# Patient Record
Sex: FEMALE | Race: WHITE | NOT HISPANIC OR LATINO
[De-identification: names, ages, dates, MRNs, and addresses within clinical notes are randomized per-mention and may not be internally consistent; named-entity substitution may affect disease eponyms.]

---

## 2017-03-08 PROBLEM — Z00.00 ENCOUNTER FOR PREVENTIVE HEALTH EXAMINATION: Status: ACTIVE | Noted: 2017-03-08

## 2017-03-09 ENCOUNTER — FORM ENCOUNTER (OUTPATIENT)
Age: 62
End: 2017-03-09

## 2017-03-10 ENCOUNTER — OUTPATIENT (OUTPATIENT)
Dept: OUTPATIENT SERVICES | Facility: HOSPITAL | Age: 62
LOS: 1 days | End: 2017-03-10
Payer: COMMERCIAL

## 2017-03-10 ENCOUNTER — APPOINTMENT (OUTPATIENT)
Dept: ORTHOPEDIC SURGERY | Facility: CLINIC | Age: 62
End: 2017-03-10

## 2017-03-10 VITALS — HEIGHT: 65 IN | WEIGHT: 170 LBS | BODY MASS INDEX: 28.32 KG/M2

## 2017-03-10 VITALS
SYSTOLIC BLOOD PRESSURE: 112 MMHG | WEIGHT: 170 LBS | DIASTOLIC BLOOD PRESSURE: 70 MMHG | HEIGHT: 65 IN | BODY MASS INDEX: 28.32 KG/M2

## 2017-03-10 VITALS — DIASTOLIC BLOOD PRESSURE: 70 MMHG | SYSTOLIC BLOOD PRESSURE: 112 MMHG

## 2017-03-10 DIAGNOSIS — Z78.9 OTHER SPECIFIED HEALTH STATUS: ICD-10-CM

## 2017-03-10 DIAGNOSIS — M19.90 UNSPECIFIED OSTEOARTHRITIS, UNSPECIFIED SITE: ICD-10-CM

## 2017-03-10 PROCEDURE — 73564 X-RAY EXAM KNEE 4 OR MORE: CPT

## 2017-03-10 PROCEDURE — 73564 X-RAY EXAM KNEE 4 OR MORE: CPT | Mod: 26,50

## 2017-03-11 ENCOUNTER — TRANSCRIPTION ENCOUNTER (OUTPATIENT)
Age: 62
End: 2017-03-11

## 2017-03-16 ENCOUNTER — APPOINTMENT (OUTPATIENT)
Dept: ORTHOPEDIC SURGERY | Facility: CLINIC | Age: 62
End: 2017-03-16

## 2017-03-16 RX ORDER — ESTRADIOL 10 UG/1
10 INSERT VAGINAL
Qty: 8 | Refills: 0 | Status: ACTIVE | COMMUNITY
Start: 2016-10-04

## 2017-03-24 ENCOUNTER — APPOINTMENT (OUTPATIENT)
Dept: ORTHOPEDIC SURGERY | Facility: CLINIC | Age: 62
End: 2017-03-24

## 2017-04-04 ENCOUNTER — APPOINTMENT (OUTPATIENT)
Dept: ORTHOPEDIC SURGERY | Facility: CLINIC | Age: 62
End: 2017-04-04

## 2017-04-04 VITALS
SYSTOLIC BLOOD PRESSURE: 114 MMHG | WEIGHT: 170 LBS | HEIGHT: 65 IN | BODY MASS INDEX: 28.32 KG/M2 | DIASTOLIC BLOOD PRESSURE: 70 MMHG

## 2017-04-11 ENCOUNTER — APPOINTMENT (OUTPATIENT)
Dept: ORTHOPEDIC SURGERY | Facility: CLINIC | Age: 62
End: 2017-04-11

## 2017-04-11 VITALS
BODY MASS INDEX: 28.32 KG/M2 | SYSTOLIC BLOOD PRESSURE: 114 MMHG | DIASTOLIC BLOOD PRESSURE: 70 MMHG | HEIGHT: 65 IN | WEIGHT: 170 LBS

## 2017-04-17 ENCOUNTER — APPOINTMENT (OUTPATIENT)
Dept: ORTHOPEDIC SURGERY | Facility: CLINIC | Age: 62
End: 2017-04-17

## 2017-05-01 ENCOUNTER — APPOINTMENT (OUTPATIENT)
Dept: ORTHOPEDIC SURGERY | Facility: CLINIC | Age: 62
End: 2017-05-01

## 2017-05-01 VITALS
BODY MASS INDEX: 28.32 KG/M2 | WEIGHT: 170 LBS | SYSTOLIC BLOOD PRESSURE: 114 MMHG | DIASTOLIC BLOOD PRESSURE: 70 MMHG | HEIGHT: 65 IN

## 2017-06-01 ENCOUNTER — APPOINTMENT (OUTPATIENT)
Dept: ORTHOPEDIC SURGERY | Facility: CLINIC | Age: 62
End: 2017-06-01

## 2017-07-05 ENCOUNTER — APPOINTMENT (OUTPATIENT)
Dept: ORTHOPEDIC SURGERY | Facility: CLINIC | Age: 62
End: 2017-07-05

## 2017-07-05 VITALS
BODY MASS INDEX: 28.32 KG/M2 | HEIGHT: 65 IN | SYSTOLIC BLOOD PRESSURE: 114 MMHG | DIASTOLIC BLOOD PRESSURE: 70 MMHG | WEIGHT: 170 LBS

## 2017-08-15 ENCOUNTER — OUTPATIENT (OUTPATIENT)
Dept: OUTPATIENT SERVICES | Facility: HOSPITAL | Age: 62
LOS: 1 days | End: 2017-08-15

## 2017-08-15 ENCOUNTER — APPOINTMENT (OUTPATIENT)
Dept: SURGERY | Facility: CLINIC | Age: 62
End: 2017-08-15

## 2017-08-15 DIAGNOSIS — G57.61 LESION OF PLANTAR NERVE, RIGHT LOWER LIMB: ICD-10-CM

## 2017-08-15 DIAGNOSIS — Z01.818 ENCOUNTER FOR OTHER PREPROCEDURAL EXAMINATION: ICD-10-CM

## 2017-08-15 DIAGNOSIS — M20.41 OTHER HAMMER TOE(S) (ACQUIRED), RIGHT FOOT: ICD-10-CM

## 2017-08-21 ENCOUNTER — APPOINTMENT (OUTPATIENT)
Dept: ORTHOPEDIC SURGERY | Facility: CLINIC | Age: 62
End: 2017-08-21
Payer: COMMERCIAL

## 2017-08-21 PROCEDURE — 99213 OFFICE O/P EST LOW 20 MIN: CPT | Mod: 25

## 2017-08-21 PROCEDURE — 20610 DRAIN/INJ JOINT/BURSA W/O US: CPT | Mod: RT

## 2017-08-22 ENCOUNTER — RESULT REVIEW (OUTPATIENT)
Age: 62
End: 2017-08-22

## 2017-08-22 ENCOUNTER — OUTPATIENT (OUTPATIENT)
Dept: OUTPATIENT SERVICES | Facility: HOSPITAL | Age: 62
LOS: 1 days | Discharge: HOME | End: 2017-08-22

## 2017-08-24 LAB — SURGICAL PATHOLOGY STUDY: SIGNIFICANT CHANGE UP

## 2017-08-28 DIAGNOSIS — G57.61 LESION OF PLANTAR NERVE, RIGHT LOWER LIMB: ICD-10-CM

## 2017-08-28 DIAGNOSIS — J44.9 CHRONIC OBSTRUCTIVE PULMONARY DISEASE, UNSPECIFIED: ICD-10-CM

## 2017-08-28 DIAGNOSIS — M20.41 OTHER HAMMER TOE(S) (ACQUIRED), RIGHT FOOT: ICD-10-CM

## 2017-08-28 DIAGNOSIS — Z87.891 PERSONAL HISTORY OF NICOTINE DEPENDENCE: ICD-10-CM

## 2017-08-28 DIAGNOSIS — M19.90 UNSPECIFIED OSTEOARTHRITIS, UNSPECIFIED SITE: ICD-10-CM

## 2017-12-03 ENCOUNTER — FORM ENCOUNTER (OUTPATIENT)
Age: 62
End: 2017-12-03

## 2017-12-04 ENCOUNTER — APPOINTMENT (OUTPATIENT)
Dept: ORTHOPEDIC SURGERY | Facility: CLINIC | Age: 62
End: 2017-12-04

## 2017-12-04 ENCOUNTER — APPOINTMENT (OUTPATIENT)
Dept: ORTHOPEDIC SURGERY | Facility: CLINIC | Age: 62
End: 2017-12-04
Payer: COMMERCIAL

## 2017-12-04 ENCOUNTER — OUTPATIENT (OUTPATIENT)
Dept: OUTPATIENT SERVICES | Facility: HOSPITAL | Age: 62
LOS: 1 days | End: 2017-12-04

## 2017-12-04 ENCOUNTER — APPOINTMENT (OUTPATIENT)
Dept: RADIOLOGY | Facility: CLINIC | Age: 62
End: 2017-12-04
Payer: COMMERCIAL

## 2017-12-04 VITALS — HEIGHT: 66 IN | BODY MASS INDEX: 27.32 KG/M2 | WEIGHT: 170 LBS

## 2017-12-04 DIAGNOSIS — M89.8X7 OTHER SPECIFIED DISORDERS OF BONE, ANKLE AND FOOT: ICD-10-CM

## 2017-12-04 DIAGNOSIS — Z82.49 FAMILY HISTORY OF ISCHEMIC HEART DISEASE AND OTHER DISEASES OF THE CIRCULATORY SYSTEM: ICD-10-CM

## 2017-12-04 DIAGNOSIS — M79.671 PAIN IN RIGHT FOOT: ICD-10-CM

## 2017-12-04 DIAGNOSIS — Z78.9 OTHER SPECIFIED HEALTH STATUS: ICD-10-CM

## 2017-12-04 DIAGNOSIS — Z80.0 FAMILY HISTORY OF MALIGNANT NEOPLASM OF DIGESTIVE ORGANS: ICD-10-CM

## 2017-12-04 PROCEDURE — 73630 X-RAY EXAM OF FOOT: CPT | Mod: 26,RT

## 2017-12-04 PROCEDURE — 99214 OFFICE O/P EST MOD 30 MIN: CPT

## 2017-12-04 RX ORDER — MUPIROCIN 20 MG/G
2 OINTMENT TOPICAL
Qty: 22 | Refills: 0 | Status: DISCONTINUED | COMMUNITY
Start: 2016-09-29 | End: 2017-12-04

## 2017-12-04 RX ORDER — AZITHROMYCIN 250 MG/1
250 TABLET, FILM COATED ORAL
Qty: 6 | Refills: 0 | Status: DISCONTINUED | COMMUNITY
Start: 2017-01-11 | End: 2017-12-04

## 2017-12-04 RX ORDER — ALBUTEROL SULFATE 90 UG/1
108 (90 BASE) AEROSOL, METERED RESPIRATORY (INHALATION)
Qty: 18 | Refills: 0 | Status: DISCONTINUED | COMMUNITY
Start: 2017-01-04 | End: 2017-12-04

## 2017-12-04 RX ORDER — CLOBETASOL PROPIONATE 0.5 MG/G
0.05 OINTMENT TOPICAL
Qty: 60 | Refills: 0 | Status: DISCONTINUED | COMMUNITY
Start: 2016-10-04 | End: 2017-12-04

## 2017-12-04 RX ORDER — CEPHALEXIN 500 MG/1
500 CAPSULE ORAL
Qty: 12 | Refills: 0 | Status: DISCONTINUED | COMMUNITY
Start: 2016-09-30 | End: 2017-12-04

## 2017-12-04 RX ORDER — HYDROCODONE BITARTRATE AND HOMATROPINE METHYLBROMIDE 5; 1.5 MG/5ML; MG/5ML
5-1.5 SOLUTION ORAL
Qty: 180 | Refills: 0 | Status: DISCONTINUED | COMMUNITY
Start: 2017-01-04 | End: 2017-12-04

## 2017-12-04 RX ORDER — ZOLPIDEM TARTRATE 5 MG/1
5 TABLET ORAL
Qty: 15 | Refills: 0 | Status: DISCONTINUED | COMMUNITY
Start: 2017-04-13 | End: 2017-12-04

## 2017-12-04 RX ORDER — AMOXICILLIN AND CLAVULANATE POTASSIUM 500; 125 MG/1; MG/1
500-125 TABLET, FILM COATED ORAL
Qty: 14 | Refills: 0 | Status: DISCONTINUED | COMMUNITY
Start: 2017-01-04 | End: 2017-12-04

## 2017-12-04 RX ORDER — LIFITEGRAST 50 MG/ML
5 SOLUTION/ DROPS OPHTHALMIC
Qty: 60 | Refills: 0 | Status: DISCONTINUED | COMMUNITY
Start: 2017-04-12 | End: 2017-12-04

## 2017-12-04 RX ORDER — VALACYCLOVIR 1 G/1
1 TABLET, FILM COATED ORAL
Qty: 20 | Refills: 0 | Status: DISCONTINUED | COMMUNITY
Start: 2016-11-30 | End: 2017-12-04

## 2017-12-04 RX ORDER — DOXYCYCLINE HYCLATE 100 MG/1
100 CAPSULE ORAL
Qty: 14 | Refills: 0 | Status: DISCONTINUED | COMMUNITY
Start: 2016-12-23 | End: 2017-12-04

## 2017-12-04 RX ORDER — HYDROCORTISONE 25 MG/G
2.5 OINTMENT TOPICAL TWICE DAILY
Qty: 20 | Refills: 0 | Status: DISCONTINUED | COMMUNITY
Start: 2016-11-17 | End: 2017-12-04

## 2017-12-04 RX ORDER — MUPIROCIN 2 G/100G
2 CREAM TOPICAL
Qty: 30 | Refills: 0 | Status: DISCONTINUED | COMMUNITY
Start: 2016-09-30 | End: 2017-12-04

## 2017-12-04 RX ORDER — TIOTROPIUM BROMIDE 18 UG/1
18 CAPSULE ORAL; RESPIRATORY (INHALATION)
Qty: 30 | Refills: 0 | Status: DISCONTINUED | COMMUNITY
Start: 2016-10-07 | End: 2017-12-04

## 2017-12-04 RX ORDER — CEFDINIR 300 MG/1
300 CAPSULE ORAL
Qty: 14 | Refills: 0 | Status: DISCONTINUED | COMMUNITY
Start: 2016-11-22 | End: 2017-12-04

## 2017-12-06 PROBLEM — M89.8X7 EXOSTOSIS OF TOE: Status: ACTIVE | Noted: 2017-12-06

## 2017-12-06 PROBLEM — M79.671 RIGHT FOOT PAIN: Noted: 2017-11-29

## 2017-12-06 PROBLEM — Z82.49 FAMILY HISTORY OF CARDIAC DISORDER: Status: ACTIVE | Noted: 2017-12-04

## 2017-12-06 PROBLEM — Z78.9 CONSUMES ALCOHOL OCCASIONALLY: Status: ACTIVE | Noted: 2017-12-04

## 2017-12-27 ENCOUNTER — APPOINTMENT (OUTPATIENT)
Dept: ORTHOPEDIC SURGERY | Facility: CLINIC | Age: 62
End: 2017-12-27

## 2018-01-10 ENCOUNTER — APPOINTMENT (OUTPATIENT)
Dept: ORTHOPEDIC SURGERY | Facility: CLINIC | Age: 63
End: 2018-01-10
Payer: COMMERCIAL

## 2018-01-10 PROCEDURE — 20610 DRAIN/INJ JOINT/BURSA W/O US: CPT | Mod: 50

## 2018-01-10 RX ADMIN — Medication 0 MG/2ML: at 00:00

## 2018-01-29 ENCOUNTER — APPOINTMENT (OUTPATIENT)
Dept: ORTHOPEDIC SURGERY | Facility: CLINIC | Age: 63
End: 2018-01-29
Payer: COMMERCIAL

## 2018-01-29 PROCEDURE — 20610 DRAIN/INJ JOINT/BURSA W/O US: CPT | Mod: 50

## 2018-02-12 ENCOUNTER — APPOINTMENT (OUTPATIENT)
Dept: ORTHOPEDIC SURGERY | Facility: CLINIC | Age: 63
End: 2018-02-12
Payer: COMMERCIAL

## 2018-02-12 PROCEDURE — 20610 DRAIN/INJ JOINT/BURSA W/O US: CPT | Mod: 50

## 2018-02-20 ENCOUNTER — APPOINTMENT (OUTPATIENT)
Dept: ORTHOPEDIC SURGERY | Facility: CLINIC | Age: 63
End: 2018-02-20
Payer: COMMERCIAL

## 2018-02-20 PROCEDURE — 20610 DRAIN/INJ JOINT/BURSA W/O US: CPT | Mod: 50

## 2018-05-29 ENCOUNTER — APPOINTMENT (OUTPATIENT)
Dept: ORTHOPEDIC SURGERY | Facility: CLINIC | Age: 63
End: 2018-05-29
Payer: COMMERCIAL

## 2018-05-29 DIAGNOSIS — M17.11 UNILATERAL PRIMARY OSTEOARTHRITIS, RIGHT KNEE: ICD-10-CM

## 2018-05-29 DIAGNOSIS — M17.12 UNILATERAL PRIMARY OSTEOARTHRITIS, LEFT KNEE: ICD-10-CM

## 2018-05-29 PROCEDURE — 99213 OFFICE O/P EST LOW 20 MIN: CPT | Mod: 25

## 2018-05-29 PROCEDURE — 20610 DRAIN/INJ JOINT/BURSA W/O US: CPT | Mod: 50

## 2018-07-10 ENCOUNTER — APPOINTMENT (OUTPATIENT)
Dept: ORTHOPEDIC SURGERY | Facility: CLINIC | Age: 63
End: 2018-07-10

## 2018-07-22 PROBLEM — Z80.0 FAMILY HISTORY OF COLON CANCER: Status: ACTIVE | Noted: 2017-12-04

## 2018-07-24 ENCOUNTER — INPATIENT (INPATIENT)
Facility: HOSPITAL | Age: 63
LOS: 0 days | Discharge: HOME | DRG: 313 | End: 2018-07-25
Attending: INTERNAL MEDICINE | Admitting: INTERNAL MEDICINE
Payer: COMMERCIAL

## 2018-07-24 VITALS
WEIGHT: 170.42 LBS | TEMPERATURE: 98 F | RESPIRATION RATE: 18 BRPM | SYSTOLIC BLOOD PRESSURE: 151 MMHG | DIASTOLIC BLOOD PRESSURE: 86 MMHG | HEART RATE: 90 BPM | OXYGEN SATURATION: 99 %

## 2018-07-24 DIAGNOSIS — R63.8 OTHER SYMPTOMS AND SIGNS CONCERNING FOOD AND FLUID INTAKE: ICD-10-CM

## 2018-07-24 DIAGNOSIS — Z29.9 ENCOUNTER FOR PROPHYLACTIC MEASURES, UNSPECIFIED: ICD-10-CM

## 2018-07-24 PROCEDURE — 71046 X-RAY EXAM CHEST 2 VIEWS: CPT | Mod: 26

## 2018-07-24 PROCEDURE — 99285 EMERGENCY DEPT VISIT HI MDM: CPT | Mod: 25

## 2018-07-24 PROCEDURE — 99222 1ST HOSP IP/OBS MODERATE 55: CPT | Mod: AI

## 2018-07-24 PROCEDURE — 93010 ELECTROCARDIOGRAM REPORT: CPT

## 2018-07-24 RX ORDER — HEPARIN SODIUM 5000 [USP'U]/ML
5000 INJECTION INTRAVENOUS; SUBCUTANEOUS EVERY 8 HOURS
Qty: 0 | Refills: 0 | Status: DISCONTINUED | OUTPATIENT
Start: 2018-07-24 | End: 2018-07-25

## 2018-07-24 RX ORDER — ASPIRIN/CALCIUM CARB/MAGNESIUM 324 MG
325 TABLET ORAL ONCE
Qty: 0 | Refills: 0 | Status: COMPLETED | OUTPATIENT
Start: 2018-07-24 | End: 2018-07-24

## 2018-07-24 RX ORDER — LIDOCAINE 4 G/100G
5 CREAM TOPICAL ONCE
Qty: 0 | Refills: 0 | Status: COMPLETED | OUTPATIENT
Start: 2018-07-24 | End: 2018-07-24

## 2018-07-24 RX ORDER — LANOLIN ALCOHOL/MO/W.PET/CERES
5 CREAM (GRAM) TOPICAL AT BEDTIME
Qty: 0 | Refills: 0 | Status: DISCONTINUED | OUTPATIENT
Start: 2018-07-24 | End: 2018-07-25

## 2018-07-24 RX ORDER — FAMOTIDINE 10 MG/ML
20 INJECTION INTRAVENOUS ONCE
Qty: 0 | Refills: 0 | Status: COMPLETED | OUTPATIENT
Start: 2018-07-24 | End: 2018-07-24

## 2018-07-24 RX ADMIN — Medication 30 MILLILITER(S): at 21:38

## 2018-07-24 RX ADMIN — LIDOCAINE 5 MILLILITER(S): 4 CREAM TOPICAL at 21:39

## 2018-07-24 RX ADMIN — FAMOTIDINE 20 MILLIGRAM(S): 10 INJECTION INTRAVENOUS at 20:53

## 2018-07-24 RX ADMIN — Medication 325 MILLIGRAM(S): at 22:02

## 2018-07-24 NOTE — H&P ADULT - NSHPPHYSICALEXAM_GEN_ALL_CORE
General: Lying in bed comfortably, in NAD  HEENT: Aniteric sclera, No pallor noted. Oral mucosa moist.  Neck: No JVD. No lymphadenopathy.  Resp: Clear to auscultation bilaterally  Cardiac: S1, S2 appreciated, No murmurs, rubs or gallops. Regular Rate and Rhythm.  GI: Soft, nontender, nondistended with + Bowel sounds  Neuro: AAOx3, following commands, moving all extremities.   Extremities: No edema noted, DP and radial pulses intact.

## 2018-07-24 NOTE — ED ADULT NURSE NOTE - OBJECTIVE STATEMENT
64 y/o female arrived to [facility] ER reporting worsening chest pain accompanied with periods of difficulty breathing and nausea since last sunday. Pt verbalized that she took nexium prior to ER arrival that had some effect. Upon assessment, abdomen soft, lung fields WNL, breathing is equal and unlabored, pulses palpable, no visible acute injuries noted. Pt denies blurred vision, slurred speech, vomiting, diarrhea, fever, chills, LOC, SOB, weakness, fatigue, recent travel, recent injury, and palpitations. EKG performed. care in progress.

## 2018-07-24 NOTE — H&P ADULT - ASSESSMENT
63F with PMH of HTN, HLD presenting for epigastric and substernal chest pain, with noted ST changes in V4-6 with negative cardiac enzymes x1. Patient admitted to [facility] for ACS r/o.

## 2018-07-24 NOTE — ED PROVIDER NOTE - MEDICAL DECISION MAKING DETAILS
? reflux discussed + GI f/u in place + cardiac enzymes, ekg, cxr and supportive care in ED + shared decision making towards disposition and outpatient follow up ? reflux discussed + GI f/u in place + cardiac enzymes, ekg, cxr and supportive care in ED + shared decision making towards disposition and outpatient follow up versus admission and in conjunction with PMD Dr SIENNA Cavanaugh plan cards admission best course of care with patient amenable and cards fellow with patient in ED

## 2018-07-24 NOTE — ED PROVIDER NOTE - ATTENDING CONTRIBUTION TO CARE
I discussed the plan of care of the patient directly with the PA while the patient was in the Emergency Department. I did not perform a face to face diagnostic evaluation on this patient. I have reviewed the ACP note and agree with the history, exam, and plan of care.

## 2018-07-24 NOTE — ED ADULT TRIAGE NOTE - CHIEF COMPLAINT QUOTE
I am having difficulty breathing.  It started gradually on Sunday and has gotten progressively worse.  I used to smoke and am pre-ephasemic.

## 2018-07-24 NOTE — H&P ADULT - NSHPLABSRESULTS_GEN_ALL_CORE
LABS:                        13.9   5.0   )-----------( 232      ( 24 Jul 2018 20:54 )             40.7     07-24    139  |  98  |  11  ----------------------------<  107<H>  4.2   |  24  |  0.66    Ca    9.2      24 Jul 2018 20:54  Mg     2.2     07-24    TPro  7.0  /  Alb  4.5  /  TBili  0.8  /  DBili  x   /  AST  22  /  ALT  34  /  AlkPhos  57  07-24    PT/INR - ( 24 Jul 2018 20:54 )   PT: 11.1 sec;   INR: 1.00          PTT - ( 24 Jul 2018 20:54 )  PTT:27.7 sec

## 2018-07-24 NOTE — ED PROVIDER NOTE - OBJECTIVE STATEMENT
atypical chest burning pain of a waxing and waning nature last few days and thus to ED for care + FH cardiac with neg w/u ~ 1-2 years ago albeit hx limited by not clear ion what type cardiac w/u Current discomfort not described as sob  or related to food or position

## 2018-07-24 NOTE — H&P ADULT - PROBLEM SELECTOR PLAN 1
P/w Chest pain and epigastric pain. No notable palliative factors. Notes to be worse with lying flat. Has been intermittent and describes as burning. Trop neg x1. EKG NSR though notable for ST depressions in therefore admitted to telemetry for ACS r/o.   -repeat troponin  -c/w Telemetry monitor  -lipid panel, A1c and TSH in AM. P/w Chest pain and epigastric pain. No notable palliative factors. Notes to be worse with lying flat. Has been intermittent and describes as burning. Trop neg x1. EKG NSR though notable for ST depressions in therefore admitted to telemetry for ACS r/o.   -repeat troponin  -c/w Telemetry monitor  -lipid panel, A1c and TSH in AM.  -Echocardiogram and CT coronaries for AM.

## 2018-07-24 NOTE — H&P ADULT - ATTENDING COMMENTS
Assessment: Patient personally seen and examined myself during rounds with the Physician Assistant/House Staff/Nurse Practitioner   Physician Assistant/House Staff/Nurse Practitioner note read, including vitals, physical findings, laboratory data, and radiological reports.   Revisions included below.   Direct personal management at bed side and extensive interpretation of the data.    Plan was outlined and discussed in details with the Physician Assistant/House Staff/Nurse Practitioner.    Decision making of high complexity   Risk high of complications, morbidity, and/or mortality  Assessment and Action taken for acute disease activity to reflect the level of care provided:  -Hemodynamic evaluation and support  -Medication reconciliation  -Review laboratory data  -EKG reviewed   -Echo reviewed   -ACS assessment and treatment as applicable  -Heart failure assessment and treatment as applicable  -Cardiac Telemetry reviewed  -Interdisciplinary discussion with IC / EP / HF / CTS teams as needed  TIME SPENT in evaluation and management, reassessments, review and interpretation of labs and x-rays, and hemodynamic management, formulating a plan and coordinating care: ___50____ MIN.  Time does not include procedural time.    Margo Villafuerte MD  Cardiology     Mobile: [phone]  Office: [phone]  [address] [place] [address]

## 2018-07-25 ENCOUNTER — APPOINTMENT (OUTPATIENT)
Dept: ORTHOPEDIC SURGERY | Facility: CLINIC | Age: 63
End: 2018-07-25

## 2018-07-25 ENCOUNTER — TRANSCRIPTION ENCOUNTER (OUTPATIENT)
Age: 63
End: 2018-07-25

## 2018-07-25 VITALS — TEMPERATURE: 97 F

## 2018-07-25 DIAGNOSIS — Z90.722 ACQUIRED ABSENCE OF OVARIES, BILATERAL: Chronic | ICD-10-CM

## 2018-07-25 LAB
ANION GAP SERPL CALC-SCNC: 12 MMOL/L — SIGNIFICANT CHANGE UP (ref 5–17)
BUN SERPL-MCNC: 11 MG/DL — SIGNIFICANT CHANGE UP (ref 7–23)
CALCIUM SERPL-MCNC: 8.8 MG/DL — SIGNIFICANT CHANGE UP (ref 8.4–10.5)
CHLORIDE SERPL-SCNC: 101 MMOL/L — SIGNIFICANT CHANGE UP (ref 96–108)
CHOLEST SERPL-MCNC: 167 MG/DL — SIGNIFICANT CHANGE UP (ref 10–199)
CK MB CFR SERPL CALC: <1 NG/ML — SIGNIFICANT CHANGE UP (ref 0–6.7)
CK SERPL-CCNC: 38 U/L — SIGNIFICANT CHANGE UP (ref 25–170)
CO2 SERPL-SCNC: 26 MMOL/L — SIGNIFICANT CHANGE UP (ref 22–31)
CREAT SERPL-MCNC: 0.67 MG/DL — SIGNIFICANT CHANGE UP (ref 0.5–1.3)
GLUCOSE BLDC GLUCOMTR-MCNC: 92 MG/DL — SIGNIFICANT CHANGE UP (ref 70–99)
GLUCOSE SERPL-MCNC: 103 MG/DL — HIGH (ref 70–99)
HBA1C BLD-MCNC: 5 % — SIGNIFICANT CHANGE UP (ref 4–5.6)
HCT VFR BLD CALC: 36.7 % — SIGNIFICANT CHANGE UP (ref 34.5–45)
HDLC SERPL-MCNC: 50 MG/DL — SIGNIFICANT CHANGE UP (ref 40–125)
HGB BLD-MCNC: 12.3 G/DL — SIGNIFICANT CHANGE UP (ref 11.5–15.5)
LIPID PNL WITH DIRECT LDL SERPL: 97 MG/DL — SIGNIFICANT CHANGE UP
MAGNESIUM SERPL-MCNC: 2.3 MG/DL — SIGNIFICANT CHANGE UP (ref 1.6–2.6)
MCHC RBC-ENTMCNC: 30.4 PG — SIGNIFICANT CHANGE UP (ref 27–34)
MCHC RBC-ENTMCNC: 33.5 G/DL — SIGNIFICANT CHANGE UP (ref 32–36)
MCV RBC AUTO: 90.6 FL — SIGNIFICANT CHANGE UP (ref 80–100)
PLATELET # BLD AUTO: 206 K/UL — SIGNIFICANT CHANGE UP (ref 150–400)
POTASSIUM SERPL-MCNC: 3.6 MMOL/L — SIGNIFICANT CHANGE UP (ref 3.5–5.3)
POTASSIUM SERPL-SCNC: 3.6 MMOL/L — SIGNIFICANT CHANGE UP (ref 3.5–5.3)
RBC # BLD: 4.05 M/UL — SIGNIFICANT CHANGE UP (ref 3.8–5.2)
RBC # FLD: 13.3 % — SIGNIFICANT CHANGE UP (ref 10.3–16.9)
SODIUM SERPL-SCNC: 139 MMOL/L — SIGNIFICANT CHANGE UP (ref 135–145)
TOTAL CHOLESTEROL/HDL RATIO MEASUREMENT: 3.3 RATIO — SIGNIFICANT CHANGE UP (ref 3.3–7.1)
TRIGL SERPL-MCNC: 101 MG/DL — SIGNIFICANT CHANGE UP (ref 10–149)
TROPONIN T SERPL-MCNC: <0.01 NG/ML — SIGNIFICANT CHANGE UP (ref 0–0.01)
TSH SERPL-MCNC: 2.89 UIU/ML — SIGNIFICANT CHANGE UP (ref 0.35–4.94)
WBC # BLD: 3 K/UL — LOW (ref 3.8–10.5)
WBC # FLD AUTO: 3 K/UL — LOW (ref 3.8–10.5)

## 2018-07-25 PROCEDURE — 93306 TTE W/DOPPLER COMPLETE: CPT | Mod: 26

## 2018-07-25 PROCEDURE — 93010 ELECTROCARDIOGRAM REPORT: CPT

## 2018-07-25 PROCEDURE — 99238 HOSP IP/OBS DSCHRG MGMT 30/<: CPT

## 2018-07-25 RX ORDER — POTASSIUM CHLORIDE 20 MEQ
40 PACKET (EA) ORAL ONCE
Qty: 0 | Refills: 0 | Status: COMPLETED | OUTPATIENT
Start: 2018-07-25 | End: 2018-07-25

## 2018-07-25 RX ADMIN — Medication 5 MILLIGRAM(S): at 00:26

## 2018-07-25 RX ADMIN — Medication 40 MILLIEQUIVALENT(S): at 06:29

## 2018-07-25 RX ADMIN — HEPARIN SODIUM 5000 UNIT(S): 5000 INJECTION INTRAVENOUS; SUBCUTANEOUS at 06:13

## 2018-07-25 NOTE — DISCHARGE NOTE ADULT - CARE PLAN
Principal Discharge DX:	Atypical chest pain  Goal:	Follow up with your PMD and cardiologist in 1-2 weeks.  Assessment and plan of treatment:	You came into the hospital for chest pain and had bloodwork and EKGs performed that DID NOT show you had a heart attack. Your EKG was compared to your outpatient EKGs, and it was similar when compared. You had echocardiogram that showed you have a normal heart pumping function (EF 60-65%), it also showed some mildly leaky heart valves: mild tricuspid regurgitation and trace mitral regurgitation. You had a CT Scan of the Heart that showed

## 2018-07-25 NOTE — DISCHARGE NOTE ADULT - HOSPITAL COURSE
63F with PMH HTN, HLD presenting for epigastric and chest pain for 3 days duration. Patient states noted some burning pain in epigastric region of abdomen as well as sternum on Sunday (7/22). Patient states that she assumed it was reflux and did not do anything about it. Patient states progressively worsened, with associated night time wakening (while lying flat). Reports no changes in food. Unsure of any type of food that makes it worse. Denies trying any medication to relieve pain. Patient reports no significant weight changes recently. Patient states pain is 6-7/10 that is intermittent. Denies radiation to jaw, back or arm. Denies any previous episodes of similar pain. Pt states that she presented now given family hx of cardiac disease (father with MI at age 40, passed at 59yo). Patient states that she has also recently felt that shes had chills and body aches. Patient recently treated with 2 week course of doxycycline after concern for insect bite while in [place] in June- noted to have round red rash with questionable central clearing. No witnessed ticks.   Patient denies diaphoresis though reports some nausea. No vomiting. Reports recent frontal headaches and body aches. Denies significant weight loss. Denies night sweats. Denies shortness of breath or dyspnea on exertion. Denies orthopnea. + epigastric pain with some associated nausea, no vomiting.   In ED, VS: /86, HR 90, RR 18, 98.2F, 98% O2 sat on RA. EKG notable for ST depressions in V4-6. Trop negative x1. Given Maalox and asa in ED and states significantly improved pain. 63F with PMH HTN, HLD presenting for epigastric and chest pain for 3 days duration. Patient states noted some 6/10 burning pain in epigastric region of abdomen as well as sternum on Sunday (7/22). Reports pain is intermittent w/ radiation across R chest a/w some nausea. Patient states that she assumed it was reflux and did not do anything about it, but symptoms progressively worsened, with associated night time wakening (while lying flat), and presented now given family hx of cardiac disease (father with MI at age 40, passed at 61yo). Patient recently treated with 2 week course of doxycycline after concern for insect bite while in [place] in June- noted to have round red rash with questionable central clearing. No witnessed ticks. In ED, VS: /86, HR 90, RR 18, 98.2F, 98% O2 sat on RA. EKG notable for ST depressions in V4-6. Trop negative x1. Given Maalox and asa in ED and states chest pain significantly improved. She was ruled out for ACS. Old EKGs obtained from outpt PMD office noting V4-V6 TWi/<5mm ST depressions.  Trop negative x2. CTA cardiac performed w/ calcium score of zero. Echo performed w/ EF  60-65%, trace MR, mild TR. Pt remains hemodynamically stable and will be d/c home and f/u with outpt PMD and cardiologist. 63F with PMH HTN, HLD presenting for epigastric and chest pain for 3 days duration. Patient states noted some 6/10 burning pain in epigastric region of abdomen as well as sternum on Sunday (7/22). Reports pain is intermittent w/ radiation across R chest a/w some nausea. Patient states that she assumed it was reflux and did not do anything about it, but symptoms progressively worsened, with associated night time wakening (while lying flat), and presented now given family hx of cardiac disease (father with MI at age 40, passed at 61yo). Patient recently treated with 2 week course of doxycycline after concern for insect bite while in [place] in June- noted to have round red rash with questionable central clearing. No witnessed ticks. In ED, VS: /86, HR 90, RR 18, 98.2F, 98% O2 sat on RA. EKG notable for ST depressions in V4-6. Trop negative x1. Given Maalox and asa in ED and states chest pain significantly improved. She was ruled out for ACS. Old EKGs obtained from outpt PMD office noting V4-V6 TWi/<5mm ST depressions.  Trop negative x2. CTA cardiac performed w/ calcium score of zero, prelim report negative for ischemia. Echo performed w/ EF  60-65%, trace MR, mild TR. Pt remains hemodynamically stable and will be d/c home and f/u with outpt PMD and cardiologist. Pt to be discharged prior to full CTA cardiac report resulted per Dr Villafuerte and Dr ADAN Rodríguez, CDs w/ CTA cardiac provided to pt to take to outpt cardiologist.

## 2018-07-25 NOTE — DISCHARGE NOTE ADULT - ADDITIONAL INSTRUCTIONS
1. Please follow up with your cardiologist Dr Henriquez in 2 weeks  Cristofer Henriquez (MD), Cardiovascular Medicine  [address] [place] [address]  Phone: [phone]    2. Please follow up with your PMD Dr Samina Cavanaugh in 1-2 weeks.  [phone]

## 2018-07-25 NOTE — DISCHARGE NOTE ADULT - PATIENT PORTAL LINK FT
You can access the [facility], offered by [facility], by registering with the following website: [email or web address]

## 2018-07-25 NOTE — CONSULT NOTE ADULT - ATTENDING COMMENTS
CP--r/o ACS  HTN  GI--GERD  tele/labs CP--r/o ACS  HTN  GI--GERD  tele/labs  neg trop  CV stable  d/c home out pt f/u

## 2018-07-25 NOTE — DISCHARGE NOTE ADULT - PLAN OF CARE
Follow up with your PMD and cardiologist in 1-2 weeks. You came into the hospital for chest pain and had bloodwork and EKGs performed that DID NOT show you had a heart attack. Your EKG was compared to your outpatient EKGs, and it was similar when compared. You had echocardiogram that showed you have a normal heart pumping function (EF 60-65%), it also showed some mildly leaky heart valves: mild tricuspid regurgitation and trace mitral regurgitation. You had a CT Scan of the Heart that showed

## 2018-07-25 NOTE — DISCHARGE NOTE ADULT - CARE PROVIDER_API CALL
Cristofer Henriquez (MD), Cardiovascular Medicine  [address] [place] [address]  Phone: [phone]  Fax: [phone]

## 2018-07-25 NOTE — CONSULT NOTE ADULT - SUBJECTIVE AND OBJECTIVE BOX
TIERA VELÁSQUEZ      Patient is a 63y old  Female who presents with a chief complaint of ACS r/o (24 Jul 2018 23:27)      HPI:  63F with PMH HTN, HLD presenting for epigastric and chest pain for 3 days duration. Patient states noted some burning pain in epigastric region of abdomen as well as sternum on Sunday (7/22). Patient states that she assumed it was reflux and did not do anything about it. Patient states progressively worsened, with associated night time wakening (while lying flat). Reports no changes in food. Unsure of any type of food that makes it worse. Denies trying any medication to relieve pain. Patient reports no significant weight changes recently. Patient states pain is 6-7/10 that is intermittent. Denies radiation to jaw, back or arm. Denies any previous episodes of similar pain. Pt states that she presented now given family hx of cardiac disease (father with MI at age 40, passed at 61yo). Patient states that she has also recently felt that shes had chills and body aches. Patient recently treated with 2 week course of doxycycline after concern for insect bite while in [place] in June- noted to have round red rash with questionable central clearing. No witnessed ticks.   Patient denies diaphoresis though reports some nausea. No vomiting. Reports recent frontal headaches and body aches. Denies significant weight loss. Denies night sweats. Denies shortness of breath or dyspnea on exertion. Denies orthopnea. + epigastric pain with some associated nausea, no vomiting.   In ED, VS: /86, HR 90, RR 18, 98.2F, 98% O2 sat on RA. EKG notable for ST depressions in V4-6. Trop negative x1. Given Maalox and asa in ED and states significantly improved pain. (24 Jul 2018 23:27)      Addl  Medical issues:       HEALTH ISSUES - PROBLEM Dx:  Nutrition, metabolism, and development symptoms: Nutrition, metabolism, and development symptoms  Need for prophylactic measure: Need for prophylactic measure            MEDICATIONS  (STANDING):  heparin  Injectable 5000 Unit(s) SubCutaneous every 8 hours  melatonin 5 milliGRAM(s) Oral at bedtime    MEDICATIONS  (PRN):          PAST MEDICAL & SURGICAL HISTORY:  Essential hypertension  H/O bilateral oophorectomy: Early 2000s      REVIEW OF SYSTEMS:  [x] As per HPI          Reviewed   no change                            Changes noted  CONSTITUTIONAL: No fever, weight loss, or fatigue  RESPIRATORY: No cough, wheezing, chills or hemoptysis; No Shortness of Breath  CARDIOVASCULAR: No chest pain, palpitations, dizziness, or leg swelling  GASTROINTESTINAL: No abdominal or epigastric pain. No nausea, vomiting, or hematemesis; No diarrhea or constipation. No melena or hematochezia.  MUSCULOSKELETAL: No joint pain or swelling; No muscle, back, or extremity pain  Neuro:   Grossly  Negative  Psych        Awake  alert  [x] All others negative	  [ ] Unable to obtain      Vital Signs Last 24 Hrs  T(C): 36.1 (25 Jul 2018 14:06), Max: 37.8 (24 Jul 2018 23:15)  T(F): 97 (25 Jul 2018 14:06), Max: 100 (24 Jul 2018 23:15)  HR: 60 (25 Jul 2018 11:45) (60 - 90)  BP: 92/56 (25 Jul 2018 11:45) (92/56 - 151/86)  BP(mean): 65 (25 Jul 2018 11:45) (65 - 77)  RR: 16 (25 Jul 2018 11:45) (16 - 18)  SpO2: 95% (25 Jul 2018 11:45) (94% - 99%)    PHYSICAL EXAM:      Constitutional:    Eyes:    ENMT:    Neck:    Breasts:    Back:    Respiratory:    Cardiovascular:    Gastrointestinal:    Genitourinary:    Rectal:    Extremities:    Vascular:    Neurological:    Skin:    Lymph Nodes:    Musculoskeletal:    Psychiatric:                              12.3   3.0   )-----------( 206      ( 25 Jul 2018 05:42 )             36.7     07-25    139  |  101  |  11  ----------------------------<  103<H>  3.6   |  26  |  0.67    Ca    8.8      25 Jul 2018 05:44  Mg     2.3     07-25    TPro  7.0  /  Alb  4.5  /  TBili  0.8  /  DBili  x   /  AST  22  /  ALT  34  /  AlkPhos  57  07-24    CARDIAC MARKERS ( 24 Jul 2018 20:54 )  x     / <0.01 ng/mL / 47 U/L / x     / <1.0 ng/mL      PT/INR - ( 24 Jul 2018 20:54 )   PT: 11.1 sec;   INR: 1.00          PTT - ( 24 Jul 2018 20:54 )  PTT:27.7 sec  CAPILLARY BLOOD GLUCOSE      POCT Blood Glucose.: 92 mg/dL (25 Jul 2018 11:24)      I&O's Summary          ASSESSMENT/PLAN/RECOMMENDATIONS TIERA EVLÁSQUEZ      Patient is a 63y old  Female who presents with a chief complaint of ACS r/o (24 Jul 2018 23:27)      HPI:  63F with PMH HTN, HLD presenting for epigastric and chest pain for 3 days duration. Patient states noted some burning pain in epigastric region of abdomen as well as sternum on Sunday (7/22). Patient states that she assumed it was reflux and did not do anything about it. Patient states progressively worsened, with associated night time wakening (while lying flat). Reports no changes in food. Unsure of any type of food that makes it worse. Denies trying any medication to relieve pain. Patient reports no significant weight changes recently. Patient states pain is 6-7/10 that is intermittent. Denies radiation to jaw, back or arm. Denies any previous episodes of similar pain. Pt states that she presented now given family hx of cardiac disease (father with MI at age 40, passed at 61yo). Patient states that she has also recently felt that shes had chills and body aches. Patient recently treated with 2 week course of doxycycline after concern for insect bite while in [place] in June- noted to have round red rash with questionable central clearing. No witnessed ticks.   Patient denies diaphoresis though reports some nausea. No vomiting. Reports recent frontal headaches and body aches. Denies significant weight loss. Denies night sweats. Denies shortness of breath or dyspnea on exertion. Denies orthopnea. + epigastric pain with some associated nausea, no vomiting.   In ED, VS: /86, HR 90, RR 18, 98.2F, 98% O2 sat on RA. EKG notable for ST depressions in V4-6. Trop negative x1. Given Maalox and asa in ED and states significantly improved pain. (24 Jul 2018 23:27)      Addl  Medical issues:       HEALTH ISSUES - PROBLEM Dx:  Nutrition, metabolism, and development symptoms: Nutrition, metabolism, and development symptoms  Need for prophylactic measure: Need for prophylactic measure            MEDICATIONS  (STANDING):  heparin  Injectable 5000 Unit(s) SubCutaneous every 8 hours  melatonin 5 milliGRAM(s) Oral at bedtime    MEDICATIONS  (PRN):          PAST MEDICAL & SURGICAL HISTORY:  Essential hypertension  H/O bilateral oophorectomy: Early 2000s      REVIEW OF SYSTEMS:  [x] As per HPI          Reviewed   no change                            Changes noted  CONSTITUTIONAL: No fever, weight loss, or fatigue  RESPIRATORY: No cough, wheezing, chills or hemoptysis; No Shortness of Breath  CARDIOVASCULAR:   GASTROINTESTINAL: No abdominal or epigastric pain. No nausea, vomiting, or hematemesis; No diarrhea or constipation. No melena or hematochezia.  MUSCULOSKELETAL: No joint pain or swelling; No muscle, back, or extremity pain  Neuro:   Grossly  Negative  Psych        Awake  alert  [x] All others negative	  [ ] Unable to obtain      Vital Signs Last 24 Hrs  T(C): 36.1 (25 Jul 2018 14:06), Max: 37.8 (24 Jul 2018 23:15)  T(F): 97 (25 Jul 2018 14:06), Max: 100 (24 Jul 2018 23:15)  HR: 60 (25 Jul 2018 11:45) (60 - 90)  BP: 92/56 (25 Jul 2018 11:45) (92/56 - 151/86)  BP(mean): 65 (25 Jul 2018 11:45) (65 - 77)  RR: 16 (25 Jul 2018 11:45) (16 - 18)  SpO2: 95% (25 Jul 2018 11:45) (94% - 99%)    PHYSICAL EXAM:      Constitutional:nad    Eyes:    ENMT:    Neck:neg    Breasts:    Back:    Respiratory:clear    Cardiovascular:s1s2    Gastrointestinal:soft BS present    Genitourinary:    Rectal:    Extremities:FROM    Vascular:    Neurological:neg    Skin:    Lymph Nodes:    Musculoskeletal:wnl    Psychiatric:awake/alert                              12.3   3.0   )-----------( 206      ( 25 Jul 2018 05:42 )             36.7     07-25    139  |  101  |  11  ----------------------------<  103<H>  3.6   |  26  |  0.67    Ca    8.8      25 Jul 2018 05:44  Mg     2.3     07-25    TPro  7.0  /  Alb  4.5  /  TBili  0.8  /  DBili  x   /  AST  22  /  ALT  34  /  AlkPhos  57  07-24    CARDIAC MARKERS ( 24 Jul 2018 20:54 )  x     / <0.01 ng/mL / 47 U/L / x     / <1.0 ng/mL      PT/INR - ( 24 Jul 2018 20:54 )   PT: 11.1 sec;   INR: 1.00          PTT - ( 24 Jul 2018 20:54 )  PTT:27.7 sec  CAPILLARY BLOOD GLUCOSE      POCT Blood Glucose.: 92 mg/dL (25 Jul 2018 11:24)      I&O's Summary          ASSESSMENT/PLAN/RECOMMENDATIONS

## 2018-07-26 PROCEDURE — 75574 CT ANGIO HRT W/3D IMAGE: CPT | Mod: 26

## 2018-07-26 PROCEDURE — 83880 ASSAY OF NATRIURETIC PEPTIDE: CPT

## 2018-07-26 PROCEDURE — 82550 ASSAY OF CK (CPK): CPT

## 2018-07-26 PROCEDURE — 71046 X-RAY EXAM CHEST 2 VIEWS: CPT

## 2018-07-26 PROCEDURE — 84443 ASSAY THYROID STIM HORMONE: CPT

## 2018-07-26 PROCEDURE — 80048 BASIC METABOLIC PNL TOTAL CA: CPT

## 2018-07-26 PROCEDURE — 85610 PROTHROMBIN TIME: CPT

## 2018-07-26 PROCEDURE — 36415 COLL VENOUS BLD VENIPUNCTURE: CPT

## 2018-07-26 PROCEDURE — 99285 EMERGENCY DEPT VISIT HI MDM: CPT | Mod: 25

## 2018-07-26 PROCEDURE — 83735 ASSAY OF MAGNESIUM: CPT

## 2018-07-26 PROCEDURE — 82962 GLUCOSE BLOOD TEST: CPT

## 2018-07-26 PROCEDURE — 85027 COMPLETE CBC AUTOMATED: CPT

## 2018-07-26 PROCEDURE — 84484 ASSAY OF TROPONIN QUANT: CPT

## 2018-07-26 PROCEDURE — 75574 CT ANGIO HRT W/3D IMAGE: CPT

## 2018-07-26 PROCEDURE — 93005 ELECTROCARDIOGRAM TRACING: CPT

## 2018-07-26 PROCEDURE — 96374 THER/PROPH/DIAG INJ IV PUSH: CPT

## 2018-07-26 PROCEDURE — 85730 THROMBOPLASTIN TIME PARTIAL: CPT

## 2018-07-26 PROCEDURE — 80053 COMPREHEN METABOLIC PANEL: CPT

## 2018-07-26 PROCEDURE — 83690 ASSAY OF LIPASE: CPT

## 2018-07-26 PROCEDURE — 85025 COMPLETE CBC W/AUTO DIFF WBC: CPT

## 2018-07-26 PROCEDURE — 83036 HEMOGLOBIN GLYCOSYLATED A1C: CPT

## 2018-07-26 PROCEDURE — 93306 TTE W/DOPPLER COMPLETE: CPT

## 2018-07-26 PROCEDURE — G0378: CPT

## 2018-07-26 PROCEDURE — 82553 CREATINE MB FRACTION: CPT

## 2018-07-26 PROCEDURE — 80061 LIPID PANEL: CPT

## 2018-07-30 DIAGNOSIS — R07.89 OTHER CHEST PAIN: ICD-10-CM

## 2018-07-30 DIAGNOSIS — Z87.891 PERSONAL HISTORY OF NICOTINE DEPENDENCE: ICD-10-CM

## 2018-07-30 DIAGNOSIS — K21.9 GASTRO-ESOPHAGEAL REFLUX DISEASE WITHOUT ESOPHAGITIS: ICD-10-CM

## 2018-07-30 DIAGNOSIS — R10.13 EPIGASTRIC PAIN: ICD-10-CM

## 2018-07-30 DIAGNOSIS — Z82.49 FAMILY HISTORY OF ISCHEMIC HEART DISEASE AND OTHER DISEASES OF THE CIRCULATORY SYSTEM: ICD-10-CM

## 2018-07-30 DIAGNOSIS — E78.5 HYPERLIPIDEMIA, UNSPECIFIED: ICD-10-CM

## 2018-07-30 DIAGNOSIS — I10 ESSENTIAL (PRIMARY) HYPERTENSION: ICD-10-CM

## 2018-08-16 PROBLEM — I10 ESSENTIAL (PRIMARY) HYPERTENSION: Chronic | Status: ACTIVE | Noted: 2018-07-25

## 2018-08-22 ENCOUNTER — OUTPATIENT (OUTPATIENT)
Dept: OUTPATIENT SERVICES | Facility: HOSPITAL | Age: 63
LOS: 1 days | End: 2018-08-22
Payer: COMMERCIAL

## 2018-08-22 DIAGNOSIS — Z90.722 ACQUIRED ABSENCE OF OVARIES, BILATERAL: Chronic | ICD-10-CM

## 2018-08-22 PROCEDURE — A9537: CPT

## 2018-08-22 PROCEDURE — 78227 HEPATOBIL SYST IMAGE W/DRUG: CPT

## 2018-08-22 PROCEDURE — 78227 HEPATOBIL SYST IMAGE W/DRUG: CPT | Mod: 26

## 2018-09-05 ENCOUNTER — APPOINTMENT (OUTPATIENT)
Dept: HEART AND VASCULAR | Facility: CLINIC | Age: 63
End: 2018-09-05
Payer: COMMERCIAL

## 2018-09-05 DIAGNOSIS — R07.9 CHEST PAIN, UNSPECIFIED: ICD-10-CM

## 2018-09-05 PROCEDURE — 99214 OFFICE O/P EST MOD 30 MIN: CPT

## 2019-06-17 ENCOUNTER — APPOINTMENT (OUTPATIENT)
Dept: VASCULAR SURGERY | Facility: CLINIC | Age: 64
End: 2019-06-17
Payer: MEDICARE

## 2019-06-17 PROCEDURE — 99203 OFFICE O/P NEW LOW 30 MIN: CPT

## 2019-06-17 PROCEDURE — 93970 EXTREMITY STUDY: CPT

## 2019-06-20 NOTE — PHYSICAL EXAM
[Respiratory Effort] : normal respiratory effort [Alert] : alert [Calm] : calm [2+] : left 2+ [Varicose Veins Of Lower Extremities] : bilaterally [Ankle Swelling On The Left] : moderate [JVD] : no jugular venous distention  [de-identified] : Well appearing. NAD [de-identified] : NCAT [de-identified] : FROM

## 2019-06-20 NOTE — END OF VISIT
[FreeTextEntry3] : All medical record entries made by the Scribe were at my, Dr. Klein's, discretion and personally dictated by me on 06/17/2019 . I have reviewed the chart and agree that the record accurately reflects my personal performance of the history, physical exam, assessment and plan. I have also personally directed, reviewed and agreed to the chart.

## 2019-06-20 NOTE — ADDENDUM
[FreeTextEntry1] : This note was written by Chloe Irwin on 06/17/2019  acting as scribe for Dr. Ramirez.

## 2019-06-20 NOTE — HISTORY OF PRESENT ILLNESS
[FreeTextEntry1] : 64 year old female former smoker with HTN presents to the office today for initial evaluation of b/l varicose veins. She reports that the veins are painful, burning and feel heavy. She reports that they also bulge, worse at night and after activity. Patient denies any claudication, ulcerations, rest pain, or leg swelling. She is s/p right total knee replacement in October of 2018 and reports that the varicose veins around her right knee bulge more and are especially painful. She reports that she may need an arthroscopic procedure on her right knee.\par She reports that her father had CAD.\par

## 2019-06-20 NOTE — ASSESSMENT
[FreeTextEntry1] : 65 y/o F with symptomatic varicose veins. B/l US demonstrates no DVT or reflux, but the presence of some diffuse varicose veins. I recommend a stab phlebectomy procedure, right leg before left leg. The risks, benefits, and alternative to the proposed procedure were discussed with the patient and all questions were answered to her satisfaction. Will schedule this procedure.\par \par I informed her that I can only remove about 90% of her varicose veins but the spider veins will remain. The spider veins will not be painful but if the appearance bothers her she can have a sclerotherapy procedure to remove them. I informed her that spider vein removal is cosmetic and therefore not covered by insurance. Sessions are about $500 each and I informed her that she will need 3-4 sessions per leg.

## 2019-06-24 NOTE — ED PROVIDER NOTE - NS ED ATTENDING STATEMENT MOD
Learning About Relief for Back Pain  What is back tension and strain? Back strain happens when you overstretch, or pull, a muscle in your back. You may hurt your back in an accident or when you exercise or lift something. Most back pain will get better with rest and time. You can take care of yourself at home to help your back heal.  What can you do first to relieve back pain? When you first feel back pain, try these steps:  · Walk. Take a short walk (10 to 20 minutes) on a level surface (no slopes, hills, or stairs) every 2 to 3 hours. Walk only distances you can manage without pain, especially leg pain. · Relax. Find a comfortable position for rest. Some people are comfortable on the floor or a medium-firm bed with a small pillow under their head and another under their knees. Some people prefer to lie on their side with a pillow between their knees. Don't stay in one position for too long. · Try heat or ice. Try using a heating pad on a low or medium setting, or take a warm shower, for 15 to 20 minutes every 2 to 3 hours. Or you can buy single-use heat wraps that last up to 8 hours. You can also try an ice pack for 10 to 15 minutes every 2 to 3 hours. You can use an ice pack or a bag of frozen vegetables wrapped in a thin towel. There is not strong evidence that either heat or ice will help, but you can try them to see if they help. You may also want to try switching between heat and cold. · Take pain medicine exactly as directed. ? If the doctor gave you a prescription medicine for pain, take it as prescribed. ? If you are not taking a prescription pain medicine, ask your doctor if you can take an over-the-counter medicine. What else can you do? · Stretch and exercise. Exercises that increase flexibility may relieve your pain and make it easier for your muscles to keep your spine in a good, neutral position. And don't forget to keep walking. · Do self-massage.  You can use self-massage to unwind after work or school or to energize yourself in the morning. You can easily massage your feet, hands, or neck. Self-massage works best if you are in comfortable clothes and are sitting or lying in a comfortable position. Use oil or lotion to massage bare skin. · Reduce stress. Back pain can lead to a vicious Andreafski: Distress about the pain tenses the muscles in your back, which in turn causes more pain. Learn how to relax your mind and your muscles to lower your stress. Where can you learn more? Go to http://yahir-gary.info/. Enter E578 in the search box to learn more about \"Learning About Relief for Back Pain. \"  Current as of: September 20, 2018  Content Version: 11.9  © 3318-4117 [employer]. Care instructions adapted under license by [employer] (which disclaims liability or warranty for this information). If you have questions about a medical condition or this instruction, always ask your healthcare professional. [place] [address] any warranty or liability for your use of this information. I have personally performed a face to face diagnostic evaluation on this patient. I have reviewed the ACP note and agree with the history, exam and plan of care, except as noted.

## 2021-04-14 NOTE — DISCHARGE NOTE ADULT - MEDICATION SUMMARY - MEDICATIONS TO TAKE
I will START or STAY ON the medications listed below when I get home from the hospital:  None
choking

## 2021-05-18 ENCOUNTER — NON-APPOINTMENT (OUTPATIENT)
Age: 66
End: 2021-05-18

## 2021-05-19 ENCOUNTER — NON-APPOINTMENT (OUTPATIENT)
Age: 66
End: 2021-05-19

## 2021-05-20 ENCOUNTER — APPOINTMENT (OUTPATIENT)
Dept: COLORECTAL SURGERY | Facility: CLINIC | Age: 66
End: 2021-05-20
Payer: MEDICARE

## 2021-05-20 VITALS
DIASTOLIC BLOOD PRESSURE: 71 MMHG | HEIGHT: 66 IN | SYSTOLIC BLOOD PRESSURE: 106 MMHG | HEART RATE: 84 BPM | TEMPERATURE: 97.9 F | WEIGHT: 184 LBS | BODY MASS INDEX: 29.57 KG/M2

## 2021-05-20 DIAGNOSIS — K64.8 OTHER HEMORRHOIDS: ICD-10-CM

## 2021-05-20 PROCEDURE — 46600 DIAGNOSTIC ANOSCOPY SPX: CPT

## 2021-05-20 PROCEDURE — 99203 OFFICE O/P NEW LOW 30 MIN: CPT | Mod: 25

## 2021-05-20 RX ORDER — HYDROCORTISONE 25 MG/G
2.5 CREAM TOPICAL 3 TIMES DAILY
Qty: 1 | Refills: 3 | Status: ACTIVE | COMMUNITY
Start: 2021-05-20 | End: 1900-01-01

## 2021-05-20 NOTE — HISTORY OF PRESENT ILLNESS
[FreeTextEntry1] : 67 y/o F presents for evaluation of possible hemorrhoids and anal fissure\par \par C/o burning sensation with most BM's for the past 2 months\par Reports intermittent issues with external swelling after BM\par Denies itching or bleeding\par  x 2, episiotomy and large hemorrhoids due to prolonged pushing (5 hours) with first pregnancy  - treated with rubber band ligation at that time about 30 years ago\par BH:  once per week or longer\par H/o chronic constipation. Admits to straining\par Has never seen a GI for constipation \par No use of stool softeners, fiber supplements or laxatives\par Admits to limited dietary fiber intake. Currently drinking one larger (24-32 oz.) bottles of water daily \par FMH of CRC in mother, diagnosed in her 50's\par No ASA/NSAIDs last 7 days  \par \par Last colonoscopy completed with Dr Reed at [facility] 2-3 years ago , several benign polyps removed

## 2021-05-20 NOTE — PHYSICAL EXAM
[FreeTextEntry1] : Medical assistant present for duration of physical examination\par \par General no acute distress, alert and oriented\par Psych calm, pleasant demeanor, responding appropriately to questions\par Nonlabored breathing\par Ambulating without assistance\par Skin normal color and pigment, no visible lesions or rashes\par \par Anorectal Exam:\par Inspection no erythema, induration or fluctuance, no skin excoriation, no fissure, no inflammation of external hemorrhoidal cushions, left lateral internal hemorrhoidal prolapse visible at anal verge\par GILBERT nontender, no masses palpated, no blood on gloved finger\par \par Procedure: Anoscopy\par \par Pre procedure Diagnosis: hemorrhoids\par Post procedure Diagnosis: hemorrhoids\par Anesthesia: none\par Estimated blood loss: none\par Specimen: none\par Complications: none\par \par Consent obtained. Anoscopy was performed by passing a lighted anoscope with lubricant jelly into the anal canal and the entire anal mucosal surface was inspected. Findings included no fissure, moderate internal hemorrhoids (LL, RA, RP), no visible masses or lesions\par \par Patient tolerated examination and procedure well.\par \par \par

## 2021-05-20 NOTE — ASSESSMENT
[FreeTextEntry1] : Exam findings and diagnosis were discussed at length with patient. \par Recommendations including increased fiber intake, adequate daily hydration, stool softeners as needed, and sitz baths as needed and after bowel movements were discussed.\par Medical management, such as hydrocortisone cream, was discussed.\par Office based treatment, such as rubber band ligation, was discussed as an alternative if symptoms persist despite conservative management.\par Role of hemorrhoidectomy also discussed.\par Office procedure and surgical risks and benefits discussed.\par \par Recommend patient consider rubber band ligation. Patient agreeable and understands multiple treatments may be needed to treat multiple hemorrhoids, but she would prefer to initiate treatments at a future visit.\par Hydrocortisone prn in interim\par F/u visit to start hemorrhoid treatments\par \par All questions answered, patient expressed understanding and is agreeable to this plan.\par

## 2021-06-09 NOTE — HISTORY OF PRESENT ILLNESS
[FreeTextEntry1] : 65 y/o F presents for f/u hemorrhoid\par Last seen in the office on 5/20/21. Moderate internal hemorrhoids noted, patient deferred RBL until future appointment. Patient started on hydrocortisone TID\par \par \par BH:\par H/o chronic constipation. Admits to straining\par \par Last colonoscopy completed with Dr Reed at [facility] 2-3 years ago , several benign polyps removed \par  ASA/NSAIDs last 7 days

## 2021-06-10 ENCOUNTER — APPOINTMENT (OUTPATIENT)
Dept: COLORECTAL SURGERY | Facility: CLINIC | Age: 66
End: 2021-06-10

## 2021-10-14 NOTE — ED PROVIDER NOTE - NS ED MD DISPO DIVISION
What Type Of Note Output Would You Prefer (Optional)?: Standard Output How Severe Is Your Rash?: moderate Is This A New Presentation, Or A Follow-Up?: Follow Up Rash (3) assistive equipment and person [facility]

## 2022-09-21 NOTE — H&P ADULT - HISTORY OF PRESENT ILLNESS
63F with PMH HTN, HLD presenting for epigastric and chest pain for 3 days duration. Patient states noted some burning pain in epigastric region of abdomen as well as sternum on Sunday (7/22). Patient states that she assumed it was reflux and did not do anything about it. Patient states progressively worsened, with associated night time wakening (while lying flat). Reports no changes in food. Unsure of any type of food that makes it worse. Denies trying any medication to relieve pain. Patient reports no significant weight changes recently. Patient states pain is 6-7/10 that is intermittent. Denies radiation to jaw, back or arm. Denies any previous episodes of similar pain. Pt states that she presented now given family hx of cardiac disease (father with MI at age 40, passed at 59yo). Patient states that she has also recently felt that shes had chills and body aches. Patient recently treated with 2 week course of doxycycline after concern for insect bite while in [place] in June- noted to have round red rash with questionable central clearing. No witnessed ticks.   Patient denies diaphoresis though reports some nausea. Reports recent frontal headaches and body aches. Denies significant weight loss. Denies night sweats. Denies shortness of breath or dyspnea on exertion. Denies orthopnea. + epigastric pain with some associated nausea, no vomiting.   In ED, VS: /86, HR 90, RR 18, 98.2F, 98% O2 sat on RA. EKG notable for ST depressions in V4-6. Troponins negative x1. 63F with PMH HTN, HLD presenting for epigastric and chest pain for 3 days duration. Patient states noted some burning pain in epigastric region of abdomen as well as sternum on Sunday (7/22). Patient states that she assumed it was reflux and did not do anything about it. Patient states progressively worsened, with associated night time wakening (while lying flat). Reports no changes in food. Unsure of any type of food that makes it worse. Denies trying any medication to relieve pain. Patient reports no significant weight changes recently. Patient states pain is 6-7/10 that is intermittent. Denies radiation to jaw, back or arm. Denies any previous episodes of similar pain. Pt states that she presented now given family hx of cardiac disease (father with MI at age 40, passed at 61yo). Patient states that she has also recently felt that shes had chills and body aches. Patient recently treated with 2 week course of doxycycline after concern for insect bite while in [place] in June- noted to have round red rash with questionable central clearing. No witnessed ticks.   Patient denies diaphoresis though reports some nausea. No vomiting. Reports recent frontal headaches and body aches. Denies significant weight loss. Denies night sweats. Denies shortness of breath or dyspnea on exertion. Denies orthopnea. + epigastric pain with some associated nausea, no vomiting.   In ED, VS: /86, HR 90, RR 18, 98.2F, 98% O2 sat on RA. EKG notable for ST depressions in V4-6. Trop negative x1. Given Maalox and asa in ED and states significantly improved pain. No

## 2022-10-31 NOTE — ED ADULT NURSE NOTE - ED STAT RN HANDOFF TIME
Admission Reconciliation is Completed  Discharge Reconciliation is Not Complete Admission Reconciliation is Completed  Discharge Reconciliation is Completed 23:01

## 2023-11-17 ENCOUNTER — APPOINTMENT (OUTPATIENT)
Dept: CT IMAGING | Facility: HOSPITAL | Age: 68
End: 2023-11-17

## 2023-11-27 ENCOUNTER — APPOINTMENT (OUTPATIENT)
Dept: MRI IMAGING | Facility: HOSPITAL | Age: 68
End: 2023-11-27

## 2023-11-27 ENCOUNTER — OUTPATIENT (OUTPATIENT)
Dept: OUTPATIENT SERVICES | Facility: HOSPITAL | Age: 68
LOS: 1 days | End: 2023-11-27
Payer: MEDICARE

## 2023-11-27 ENCOUNTER — RESULT REVIEW (OUTPATIENT)
Age: 68
End: 2023-11-27

## 2023-11-27 DIAGNOSIS — Z90.722 ACQUIRED ABSENCE OF OVARIES, BILATERAL: Chronic | ICD-10-CM

## 2023-11-27 PROCEDURE — 72197 MRI PELVIS W/O & W/DYE: CPT | Mod: MH

## 2023-11-27 PROCEDURE — 74183 MRI ABD W/O CNTR FLWD CNTR: CPT | Mod: 26,MH

## 2023-11-27 PROCEDURE — 72197 MRI PELVIS W/O & W/DYE: CPT | Mod: 26,MH

## 2023-11-27 PROCEDURE — A9585: CPT

## 2023-11-27 PROCEDURE — 74183 MRI ABD W/O CNTR FLWD CNTR: CPT | Mod: MH

## 2023-11-30 NOTE — ED ADULT NURSE NOTE - NS ED NURSE REPORT GIVEN TO FT
Chief Complaint   Patient presents with    Follow-Up from Hospital     Patient here for hospital follow up. Patient originally admitted on 10/5/23 to [facility] [place] [facility] she went to MelroseWakefield PEGGY Sullivan

## 2024-04-03 ENCOUNTER — NON-APPOINTMENT (OUTPATIENT)
Age: 69
End: 2024-04-03

## 2024-04-03 ENCOUNTER — APPOINTMENT (OUTPATIENT)
Dept: OPHTHALMOLOGY | Facility: CLINIC | Age: 69
End: 2024-04-03
Payer: MEDICARE

## 2024-04-03 PROCEDURE — 92004 COMPRE OPH EXAM NEW PT 1/>: CPT

## 2024-04-03 PROCEDURE — 92250 FUNDUS PHOTOGRAPHY W/I&R: CPT

## 2024-05-23 ENCOUNTER — NON-APPOINTMENT (OUTPATIENT)
Age: 69
End: 2024-05-23

## 2024-08-16 ENCOUNTER — NON-APPOINTMENT (OUTPATIENT)
Age: 69
End: 2024-08-16

## 2024-11-04 NOTE — PATIENT PROFILE ADULT. - TEACHING/LEARNING LEARNING PREFERENCES
normal mood with appropriate affect
individual instruction/written material/skill demonstration/group instruction/verbal instruction